# Patient Record
Sex: FEMALE | Race: WHITE | NOT HISPANIC OR LATINO | Employment: STUDENT | URBAN - METROPOLITAN AREA
[De-identification: names, ages, dates, MRNs, and addresses within clinical notes are randomized per-mention and may not be internally consistent; named-entity substitution may affect disease eponyms.]

---
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
## 2021-04-26 ENCOUNTER — AMBULATORY - HEALTHEAST (OUTPATIENT)
Dept: NURSING | Facility: CLINIC | Age: 21
End: 2021-04-26

## 2021-05-03 ENCOUNTER — OFFICE VISIT - HEALTHEAST (OUTPATIENT)
Dept: FAMILY MEDICINE | Facility: CLINIC | Age: 21
End: 2021-05-03

## 2021-05-03 DIAGNOSIS — M54.2 CERVICALGIA: ICD-10-CM

## 2021-05-03 DIAGNOSIS — F40.10 SOCIAL ANXIETY DISORDER: ICD-10-CM

## 2021-05-03 RX ORDER — IBUPROFEN 600 MG/1
600 TABLET, FILM COATED ORAL EVERY 6 HOURS PRN
Status: SHIPPED | COMMUNITY
Start: 2020-10-05 | End: 2021-08-10

## 2021-05-03 ASSESSMENT — ANXIETY QUESTIONNAIRES
GAD7 TOTAL SCORE: 3
5. BEING SO RESTLESS THAT IT IS HARD TO SIT STILL: SEVERAL DAYS
4. TROUBLE RELAXING: NOT AT ALL
2. NOT BEING ABLE TO STOP OR CONTROL WORRYING: SEVERAL DAYS
7. FEELING AFRAID AS IF SOMETHING AWFUL MIGHT HAPPEN: NOT AT ALL
IF YOU CHECKED OFF ANY PROBLEMS ON THIS QUESTIONNAIRE, HOW DIFFICULT HAVE THESE PROBLEMS MADE IT FOR YOU TO DO YOUR WORK, TAKE CARE OF THINGS AT HOME, OR GET ALONG WITH OTHER PEOPLE: SOMEWHAT DIFFICULT
1. FEELING NERVOUS, ANXIOUS, OR ON EDGE: SEVERAL DAYS
3. WORRYING TOO MUCH ABOUT DIFFERENT THINGS: NOT AT ALL
6. BECOMING EASILY ANNOYED OR IRRITABLE: NOT AT ALL

## 2021-05-03 ASSESSMENT — PATIENT HEALTH QUESTIONNAIRE - PHQ9: SUM OF ALL RESPONSES TO PHQ QUESTIONS 1-9: 0

## 2021-05-17 ENCOUNTER — AMBULATORY - HEALTHEAST (OUTPATIENT)
Dept: NURSING | Facility: CLINIC | Age: 21
End: 2021-05-17

## 2021-05-27 ASSESSMENT — PATIENT HEALTH QUESTIONNAIRE - PHQ9
SUM OF ALL RESPONSES TO PHQ QUESTIONS 1-9: 2
SUM OF ALL RESPONSES TO PHQ QUESTIONS 1-9: 0
SUM OF ALL RESPONSES TO PHQ QUESTIONS 1-9: 0

## 2021-05-28 ASSESSMENT — ANXIETY QUESTIONNAIRES
GAD7 TOTAL SCORE: 3
GAD7 TOTAL SCORE: 4
GAD7 TOTAL SCORE: 3

## 2021-05-29 ENCOUNTER — HEALTH MAINTENANCE LETTER (OUTPATIENT)
Age: 21
End: 2021-05-29

## 2021-05-29 NOTE — PROGRESS NOTES
"Patient is a 17 yo female who presents for follow up of her anxiety.    She had started on fluoxetine after her last visit.  She noted that in the first two weeks, she felt jittery, sleep was difficult, and her heart was beating fast.  She notes that those symptoms have since improved. Now, she is noticing less anxiety in anticipation of interactions.  She has not had an anxiety attack since starting on the medication.      As far as school, she has one more project to present tomorrow.  She got into [employer] for college, and is now working on trying to get scholarships.  She hopes to study environmental studies and public policy.  As far as interpersonal stressors: she had one good friend that was somewhat stressful.  She had recently made the decision to no longer be friends with her anymore.  Since then, she has been more social.  She has started applying for jobs for the summer.  Her sleep is fine.  She is anxious about keeping in touch with her current friends.    Patient Active Problem List    Diagnosis Date Noted     Abdominal Pain In The Central Upper Belly (Epigastric)        Current Outpatient Medications:      FLUoxetine (PROZAC) 20 MG capsule, Take 1 capsule (20 mg total) by mouth daily., Disp: 90 capsule, Rfl: 0    Objective     BP 96/62 (Patient Site: Left Arm, Patient Position: Sitting, Cuff Size: Adult Regular)   Pulse 82   Resp 16   Ht 5' 3.75\" (1.619 m)   Wt 116 lb 8 oz (52.8 kg)   LMP 05/13/2019   BMI 20.15 kg/m    General appearance: alert, appears stated age and cooperative   Psych Exam:  Behavior:anxious    Mood:pleaseant, upbeat   Affect:normal   Eye Contact:normal   Thought Content: normal   Insight:normal    Judgement: normal     Little interest or pleasure in doing things: Not at all  Feeling down, depressed, or hopeless: Not at all  Trouble falling or staying asleep, or sleeping too much: Not at all  Feeling tired or having little energy: Several days  Poor appetite or " overeating: Not at all  Feeling bad about yourself - or that you are a failure or have let yourself or your family down: Not at all  Trouble concentrating on things, such as reading the newspaper or watching television: More than half the days  Moving or speaking so slowly that other people could have noticed. Or the opposite - being so fidgety or restless that you have been moving around a lot more than usual: Not at all  Thoughts that you would be better off dead, or of hurting yourself in some way: Not at all  PHQ-9 Total Score: 3  If you checked off any problems, how difficult have these problems made it for you to do your work, take care of things at home, or get along with other people?: Somewhat difficult    JESSE 7 = 7    Marylin was seen today for medication management.    Diagnoses and all orders for this visit:    Social anxiety disorder  -     FLUoxetine (PROZAC) 20 MG capsule; Take 1 capsule (20 mg total) by mouth daily.  Overall patient notes that she feels the medication is helping.  Its not 100% improved, but she had just taken 3 AP tests with a lot happening.  She is looking forward to her summer.  She feels fine to continue on her current dose.  I asked that she check back in in 3 months prior to leaving for college.      Other orders  -     HPV vaccine 9 valent 3 dose IM

## 2021-05-29 NOTE — TELEPHONE ENCOUNTER
RN cannot approve Refill Request    RN can NOT refill this medication med is not covered by policy/route to provider. Last office visit: 5/29/2019 Kathy Metzger MD Last Physical: Visit date not found Last MTM visit: Visit date not found Last visit same specialty: 5/29/2019 Kathy Metzger MD.  Next visit within 3 mo: Visit date not found  Next physical within 3 mo: Visit date not found      Stacy Headley, [facility] Triage/Med Refill 6/11/2019    Requested Prescriptions   Pending Prescriptions Disp Refills     FLUoxetine (PROZAC) 20 MG capsule [Pharmacy Med Name: FLUOXETINE HCL 20 MG CAPSULE] 90 capsule 0     Sig: TAKE 1 CAPSULE BY MOUTH EVERY DAY       SSRI Refill Protocol  Failed - 6/10/2019  1:21 AM        Failed - Age 21 and younger route to prescribing provider     Last office visit with prescriber/PCP: 5/29/2019 Kathy Metzger MD OR same dept: 5/29/2019 Kathy Metzger MD OR same specialty: 5/29/2019 Kathy Metzger MD  Last physical: Visit date not found Last MTM visit: Visit date not found   Next visit within 3 mo: Visit date not found  Next physical within 3 mo: Visit date not found  Prescriber OR PCP: Kathy Metzger MD  Last diagnosis associated with med order: 1. Social anxiety disorder  - FLUoxetine (PROZAC) 20 MG capsule [Pharmacy Med Name: FLUOXETINE HCL 20 MG CAPSULE]; TAKE 1 CAPSULE BY MOUTH EVERY DAY  Dispense: 90 capsule; Refill: 0    If protocol passes may refill for 12 months if within 3 months of last provider visit (or a total of 15 months).             Passed - PCP or prescribing provider visit in last year     Last office visit with prescriber/PCP: 5/29/2019 Kathy Metzger MD OR same dept: 5/29/2019 Kathy Metzger MD OR same specialty: 5/29/2019 Kathy Metzger MD  Last physical: Visit date not found Last MTM visit: Visit date not found   Next visit within 3 mo: Visit date not found  Next physical within 3 mo: Visit date not found  Prescriber OR PCP: Kathy Metzger MD  Last diagnosis associated  with med order: 1. Social anxiety disorder  - FLUoxetine (PROZAC) 20 MG capsule [Pharmacy Med Name: FLUOXETINE HCL 20 MG CAPSULE]; TAKE 1 CAPSULE BY MOUTH EVERY DAY  Dispense: 90 capsule; Refill: 0    If protocol passes may refill for 12 months if within 3 months of last provider visit (or a total of 15 months).

## 2021-05-31 VITALS — WEIGHT: 115.25 LBS | BODY MASS INDEX: 20.42 KG/M2 | HEIGHT: 63 IN

## 2021-05-31 NOTE — PROGRESS NOTES
"Patient is an 19-year-old female presents today for follow-up of her social anxiety disorder.    He is checking and prior to heading out to college next week.  Patient is moving out to [facility] in [place] next Thursday.  She just recently connected with her future roommate.  We did check a marked that she has a history of mental health concerns, and therefore she did get an email from [facility] to get her hooked into counseling.  She notes that her summer is been very good, she has not been stressed.  She notes no negative side effects to the fluoxetine.  She has stayed on fluoxetine 20 mg daily.  She is wondering if she should increase her dose of medication.  Her JESSE-7 had dropped from 7 after being on the medication for a month, to 4 today.  Her PHQ 9 has stayed steady at 3.    She notes in the past, when she felt stressed, the stress would linger for days.  She notes now when she feels stressed, as soon as the problem is resolved, the stress improves entirely.    There are no active problems to display for this patient.        Current Outpatient Medications:      FLUoxetine (PROZAC) 20 MG capsule, Take 1 capsule (20 mg total) by mouth daily., Disp: 90 capsule, Rfl: 3     Objective     BP 96/60   Pulse 80   Ht 5' 3.75\" (1.619 m)   Wt 116 lb 8 oz (52.8 kg)   SpO2 97%   BMI 20.15 kg/m    General appearance: alert, appears stated age and cooperative   Psych Exam:  Behavior:normal    Mood:pleaseant, upbeat   Affect:limited   Eye Contact:normal   Thought Content: normal   Insight:normal    Judgement: normal     JESSE 7 = 4  PHQ9 = 3    Marylin was seen today for follow-up.    Diagnoses and all orders for this visit:    Social anxiety disorder  -     FLUoxetine (PROZAC) 20 MG capsule; Take 1 capsule (20 mg total) by mouth daily.    Discussed at this time, I do not recommend prophylactically increasing her medication.  I do recommend that she get hooked in with student counseling.  I discussed with " her that if she did see an increase in her symptoms, she can certainly MyChart me and we can discuss the possibility of increasing her medication at the time, but I would expect that she follow-up with [facility] while at [facility] [employer] [facility].  Plans on returning here in December for a follow-up visit, and she will also need her last HPV at that time.  Patient to notify me if her symptoms worsen in any way.

## 2021-06-02 VITALS — WEIGHT: 116.5 LBS | HEIGHT: 64 IN | BODY MASS INDEX: 19.89 KG/M2

## 2021-06-02 VITALS — WEIGHT: 119 LBS | BODY MASS INDEX: 20.95 KG/M2

## 2021-06-03 VITALS — WEIGHT: 116.5 LBS | BODY MASS INDEX: 19.89 KG/M2 | HEIGHT: 64 IN

## 2021-06-04 VITALS
OXYGEN SATURATION: 99 % | SYSTOLIC BLOOD PRESSURE: 100 MMHG | WEIGHT: 122.8 LBS | HEIGHT: 64 IN | HEART RATE: 64 BPM | DIASTOLIC BLOOD PRESSURE: 70 MMHG | BODY MASS INDEX: 20.96 KG/M2

## 2021-06-05 VITALS
SYSTOLIC BLOOD PRESSURE: 108 MMHG | BODY MASS INDEX: 22.37 KG/M2 | HEART RATE: 82 BPM | WEIGHT: 129.3 LBS | OXYGEN SATURATION: 98 % | DIASTOLIC BLOOD PRESSURE: 68 MMHG | TEMPERATURE: 98.4 F

## 2021-06-05 NOTE — PROGRESS NOTES
HPI    Patient is an 20 yo female who presents today for follow up of her social anxiety disorder. Patient is going to [employer].  It is going well.  She is getting along with her roommate and her coursework is going well.  She is studying environmental studies.    She feels that the fluoxetine has been useful.  No side effects.  Feeling fine.  She has no concerns about panic attacks.  Things that make her anxious still do, but she feels that she can handle it a lot more.  She had to take a drivers test yesterday for the third time.  She felt anxious, but felt it was a lot more manageable than in the past.    There are no active problems to display for this patient.      Current Outpatient Medications:      FLUoxetine (PROZAC) 20 MG capsule, Take 1 capsule (20 mg total) by mouth daily., Disp: 90 capsule, Rfl: 3       Marylin was seen today for medication check.    Diagnoses and all orders for this visit:    Social anxiety disorder  -     FLUoxetine (PROZAC) 20 MG capsule; Take 1 capsule (20 mg total) by mouth daily.  Patient is doing well on the current medication.  She would like to continue for now.  Discussed the possibility of discontinuing for the summer.  She is interested in this.  She is not sure what her plans are for next summer, but possibly stay in [place].      Other orders  -     HPV vaccine 9 valent 3 dose IM  Patient is due for her last HPV vaccinations.

## 2021-06-08 NOTE — PROGRESS NOTES
"Marylin Hannah is a 19 y.o. female who is being evaluated via a billable telephone visit.      The patient has been notified of following:     \"This telephone visit will be conducted via a call between you and your physician/provider. We have found that certain health care needs can be provided without the need for a physical exam.  This service lets us provide the care you need with a short phone conversation.  If a prescription is necessary we can send it directly to your pharmacy.  If lab work is needed we can place an order for that and you can then stop by our lab to have the test done at a later time.    Telephone visits are billed at different rates depending on your insurance coverage. During this emergency period, for some insurers they may be billed the same as an in-person visit.  Please reach out to your insurance provider with any questions.    If during the course of the call the physician/provider feels a telephone visit is not appropriate, you will not be charged for this service.\"    Patient has given verbal consent to a Telephone visit? Yes    What phone number would you like to be contacted at? [phone]     Patient would like to receive their AVS by AVS Preference: Laurence.    Additional provider notes: Patient is an 19-year-old female who presents for follow-up of her anxiety.    Patient had been taking her fluoxetine 20 mg daily just prior to her freshman year at [employer].  Had a good first year.  Had to come home mid march due to the coronavirus pandemic.  That was a little bit stressful, but distance learning went fine.  Thinking environmental sciences.  This summer, she is currently doing internship to recruit students to vote.  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  No idea fall plan for school.  The school notes that they will release a fall plan on July 15th.  Hoping it is not virtual.     Patient notes that she had stopped her " medication in the beginning of May.  It went pretty well.  Get weird head rushes initially in the first week after stopping it.  She notes that since the start of the protests, she is having anxiety dreams, and she has mild anxiety during the day, but very situational, and not very difficult to deal with.  She is wondering when she should start the medication again.  I discussed that when we had met last summer, she had a lot of difficulty both with school as well as the transitioning into University.  Patient notes that she really enjoyed her University.  I discussed that I will leave it up to her as far as her symptoms of anxiety.  She is familiar with her previous sensations, and if she feels that she is heading in that direction, certainly we can restart the medication.  Patient does not feel that way now.  She will contemplate it after she gets news of what happens in the fall.    There are no active problems to display for this patient.    Current Outpatient Medications   Medication Instructions     FLUoxetine (PROZAC) 20 mg, Oral, DAILY             Assessment/Plan:  1. Social anxiety disorder  Currently she is not taking her medication for the summer and is doing well.  I did discuss with her symptoms worsen certainly she can restart it.  Patient is familiar with her previous sensations regarding her anxiety.  She did meet with a counselor couple times when she was on campus.  She notes that she did not get to connect with a counselor in the spring semester due to early dismissal from the coronavirus pandemic.  I did discuss that there are online plasma forms for therapy.  I did recommend better [email or web address].  She will consider starting her medication after she learns more about what her fall semester might look like.  I asked that she MyChart me if she does restart it, and patient notes that she will.        Phone call duration:  9 minutes    Kathy Metzger MD

## 2021-06-11 ENCOUNTER — OFFICE VISIT - HEALTHEAST (OUTPATIENT)
Dept: PHYSICAL THERAPY | Facility: REHABILITATION | Age: 21
End: 2021-06-11

## 2021-06-11 DIAGNOSIS — M54.2 NECK PAIN: ICD-10-CM

## 2021-06-11 DIAGNOSIS — G44.209 TENSION HEADACHE: ICD-10-CM

## 2021-06-13 NOTE — PROGRESS NOTES
[facility] Well Child Check    ASSESSMENT & PLAN  Marylin Hannah is a 17  y.o. 3  m.o. who has normal growth and normal development.    Diagnoses and all orders for this visit:    Encounter for routine child health examination without abnormal findings  -     Influenza, Seasonal Quad, Preservative Free 36+ Months  -     Hepatitis A vaccine Ped/Adol 2 dose IM (18yr & under)    Mother is thinking about the HPV vaccine.    Return to clinic in 1 year for a Well Child Check or sooner as needed    IMMUNIZATIONS/LABS  Immunizations were reviewed and orders were placed as appropriate. and I have discussed the risks and benefits of all of the vaccine components with the patient/parents.  All questions have been answered.    REFERRALS  Dental:  The patient has already established care with a dentist.  Other:  No additional referrals were made at this time.    ANTICIPATORY GUIDANCE  I have reviewed age appropriate anticipatory guidance.  Social:  Friends and Extracurricular Activities  Parenting:  [place]/Dependence, Homework, Chores, Family Time and Confidential Health Care  Nutrition:  Junk Food, Dieting and Body Image  Play and Communication:  Organized Sports, Creative Talents and Read Books  Health:  Acne, Self-image building, Sleep and Dental Care  Safety:  Swimming Safety and Outdoor Safety Avoiding Sun Exposure  Sexuality:  Body Changes    HEALTH HISTORY  Do you have any concerns that you'd like to discuss today?: Skin issues locate on the right hand    4 small 1 mm flat warts on right hand.  Discussed OTC methods.  If not successful, will return for cryotherapy.      1) Anxiety: going on since high school.  No bullying.  No stressful relationships.  Not a ton of friends, they are quality.  Has had stressful weeks during AP testing.  Never tearful, no panic attacks.    Feels constant.  Does not do caffeine.  Goes to bed at 10 pm.  Falls asleep by 11pm.  Screen time until 10:30.  Tends to stay away on social  media.  No stage fright.  Likes to talk in front of the class.  Discussed the option of counseling and patient would like that.  Mother will check with their benefits.  In the meantime, recommend trying to keep screen time to before 9.  Difficult due to homework.  Recommend starting on Vitamin D 3,000 IU daily.    Do you have any significant health concerns in your family history?: No  No family history on file.  Since your last visit, have there been any major changes in your family, such as a move, job change, separation, divorce, or death in the family?: No    Home  Who lives in your home?:  Parents, 2 younger siblings and a pet dog  Social History     Social History Narrative     No narrative on file     Do you have any trouble with sleep?:  No    Education  What school does your child attend?:  [employer]   What grade is your child in?:  11th  How does the patient perform in school (grades, behavior, attention, homework?: None     Eating  Does patient eat regular meals including fruits and vegetables?:  Yes- prefer to apples, banana, and grapes  What is the patient drinking (cow's milk, water, soda, juice, sports drinks, energy drinks, etc)?: cow's milk- 1%, cow's milk- 2% and water  Does patient have concerns about body or appearance?:  No    Activities  Does the patient have friends?:  yes  Does the patient get at least one hour of physical activity per day?:  yes, Soccer, bament, [place] [employer], [employer]  Does the patient have less than 2 hours of screen time per day (aside from homework)?:  yes, on average 3-5 hours. Mostly on her phone  What does your child do for exercise?:  Stretching at home and active at school   Does the patient have interest/participate in community activities/volunteers/school sports?:  yes    MENTAL HEALTH SCREENING  PHQ-2 Total Score: 0 (10/12/2017  2:38 PM)  PHQ-9 Total Score: 2 (10/12/2017  2:38 PM)    VISION/HEARING  Vision: Completed. See Results  Hearing:  Completed. See  "Results     Hearing Screening    Method: Audiometry    125Hz 250Hz 500Hz 1000Hz 2000Hz 3000Hz 4000Hz 6000Hz 8000Hz   Right ear:   20 25 20  20     Left ear:   20 25 20  20        Visual Acuity Screening    Right eye Left eye Both eyes   Without correction: 20/25 20/20 20/25   With correction:          TB Risk Assessment:  The patient and/or parent/guardian answer positive to:  patient and/or parent/guardian answer 'no' to all screening TB questions    Dental  Is your child being seen by a dentist?  Yes    Patient Active Problem List   Diagnosis     Abdominal Pain In The Central Upper Belly (Epigastric)       Drugs  Does the patient use tobacco/alcohol/drugs?:  no    Safety  Does the patient have any safety concerns (peer or home)?:  no  Does the patient use safety belts, helmets and other safety equipment?:  yes    Sex  Is the patient sexually active?:  no    MEASUREMENTS  Height:  5' 3.2\" (1.605 m)  Weight: 115 lb 4 oz (52.3 kg)  BMI: Body mass index is 20.29 kg/(m^2).  Blood Pressure: 92/60  Blood pressure percentiles are 4 % systolic and 30 % diastolic based on NHBPEP's 4th Report. Blood pressure percentile targets: 90: 124/80, 95: 128/84, 99 + 5 mmH/96.    PHYSICAL EXAM  General Appearance: Healthy-appearing, well nourished, well hydrated  Head: normocephalic, atraumatic  Eyes: Sclerae white, pupils equal and reactive, red reflex normal bilaterally   Ears: Well-positioned, well-formed pinnae; TM pearly gray, translucent, no bulging   Nose: Clear, normal mucosa   Throat: Lips, tongue and mucosa are pink, moist and intact; palate intact   Neck: Supple, symmetrical, no lymphadenopathy  Chest: Lungs clear to auscultation, respirations unlabored   Heart: Regular rate & rhythm, S1 S2, no murmurs, rubs, or gallops   Abdomen: Soft, non-tender, no masses  Pulses: Strong equal femoral pulses, brisk capillary refill   : Not performed.  Extremities: Well-perfused, warm and dry   Neuro: Symmetric tone and strength, " normal gait and coordination.  Spine: No abnormal curvature

## 2021-06-17 ENCOUNTER — OFFICE VISIT - HEALTHEAST (OUTPATIENT)
Dept: PHYSICAL THERAPY | Facility: REHABILITATION | Age: 21
End: 2021-06-17

## 2021-06-17 DIAGNOSIS — M54.2 NECK PAIN: ICD-10-CM

## 2021-06-17 DIAGNOSIS — G44.209 TENSION HEADACHE: ICD-10-CM

## 2021-06-17 NOTE — PROGRESS NOTES
Assessment & Plan     Marylin was seen today for medication management.    Diagnoses and all orders for this visit:    Social anxiety disorder  -     FLUoxetine (PROZAC) 20 MG capsule; Take 1 capsule (20 mg total) by mouth daily.  Patient is doing well on her current medication.  She is not having any untoward side effects.  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  She is plans to return to [employer] this coming fall.    Cervicalgia  -     Ambulatory referral to Adult PT- Internal  Patient has multiple trigger points on her supraspinatus, her trapezius, as well as her left sternocleidomastoid.  I did discuss gentle stretching exercises.  Would recommend evaluation by physical therapy.  Discussed the option of magnesium glycinate 400-800 mg at bedtime to help with night time clenching.      9}     Return in about 6 months (around 11/3/2021) for Annual physical.    Kathy Metzger MD  [facility]   Marylin Hannah is 20 y.o. and presents today for the following health issues   HPI     Anxiety: Patient had tried going off of her fluoxetine last spring\early summer.  She did feel her anxiety rise off with medication.  She did meet with counselors at her school.  She did restart the medication and felt her anxiety improved.  She has not felt any negative side effects of the medication.  She notes that school has been going well.  She finished her sophomore year.  She will be starting her gerardo this fall.  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    1) Muscle spasms:  Having problems with right sided muscle tension and radiation to the base of neck and headache.  Feels like there is liquid in her ears. She was seen by a walk in clinic and put her on muscle relaxants which helped, but only the 7 days.  When she moves her neck, feels something draining in ears.  Got a professional massage did nothing.     There are no active problems to display  for this patient.    Current Outpatient Medications   Medication Instructions     FLUoxetine (PROZAC) 20 mg, Oral, DAILY     ibuprofen (ADVIL,MOTRIN) 600 mg, Oral, Every 6 hours PRN           Objective    /68 (Patient Site: Left Arm, Patient Position: Sitting, Cuff Size: Adult Regular)   Pulse 82   Temp 98.4  F (36.9  C) (Tympanic)   Wt 129 lb 4.8 oz (58.7 kg)   LMP 04/05/2021   SpO2 98%   BMI 22.37 kg/m    Body mass index is 22.37 kg/m .  Physical Exam  General: Patient is alert and oriented no acute distress.  HEENT: TMs are clear bilaterally without effusion.  Neck is supple without lymphadenopathy.  Cervical spine:    Inspection: Normal posture.  Palpation: Multiple trigger points supraspinatus, left SCM with tenderness at left SCM origin.

## 2021-06-21 NOTE — LETTER
Letter by Kathy Metzger MD at      Author: Kathy Metzger MD Service: -- Author Type: --    Filed:  Encounter Date: 5/3/2021 Status: (Other)         May 3, 2021     Patient: Marylin Hannah   YOB: 2000   Date of Visit: 5/3/2021       To Whom it May Concern:    Marylin Hannah was seen in my clinic on 5/3/2021.  Patient has a diagnosis of social anxiety disorder that is made worse by constant contact with strangers, which would be present in dorms and apartments.  Patient has the opportunity to live in a small house off campus.  Due to her diagnosis, I am recommending that she be allowed to live off campus to reduce her anxiety.    If you have any questions or concerns, please don't hesitate to call.    Sincerely,         Electronically signed by Kathy Metzger MD

## 2021-06-24 NOTE — PROGRESS NOTES
HPI:  Patient is a 18-year-old female presents today for discussion of anxiety.    Patient has discussed anxiety for a number of years.  She had pursued more natural options including supplements as well as counseling.  She notes that she had done counseling every couple of weeks for 9 months, but she did not find it all that helpful.  This is her senior in high school.  She is an a student.  She has a lot of stress with regards to college applications, scholarship applications, as well as maintaining her a student status.  She is taking a lot of AP classes.  She notes that her sleep is fine.  She will sleep for up to 8 hours and wake spontaneously with good energy.  She has really good friendships.  However, her main difficulty is that she gets anxious even around her friends.  She has no phobia with presentations.  She even sings solo in her musical acts.  However, after the fact, she will ruminate about her interactions.  She finds that it interferes with her day-to-day.  She notes something as simple as knowing that she has to talk to her teacher about an issue can take up her brain space for the entire hour.  Her therapist had diagnosed her with social anxiety disorder.      She notes in the past year, it has been interfering more with her day-to-day functioning.  She notes that there are moments where the stress builds to the point where she cannot stop worrying.  She is also now at the point where she is having anxiety about having a panic attack.  He notes that these movements occur about once or twice a week.  She notes that they tend to occur at home at the end of the day.  She notes that when it does occur it can last for hours.  She tries a lot of things to calm down.  She notes that exercise helps.  She currently is in [facility] now.  She notes that exercising on the weekends is helpful.  Sometimes she has to watch YouTube to distract her mind.  She has physical symptoms of her acute stress episodes.   She feels nauseous in the pit of her stomach and sometimes she will be sweating.  She notes that she will experience headaches and stomachaches the entire time.  She notes that does occur a couple times a week.  Patient is interested in starting medication.  She had concerns that her mother would not support this option as her mother tends to lean towards natural pathic treatment.    Patient recently got [facility] into [facility].  She is happy with both of those options.    Objective     /60 (Patient Site: Right Arm, Patient Position: Sitting, Cuff Size: Adult Regular)   Pulse 71   Wt 119 lb (54 kg)   LMP 03/06/2019   SpO2 99%   General appearance: alert, appears stated age and cooperative   Psych Exam:  Behavior:normal    Mood:anxious   Affect:normal   Eye Contact:normal   Thought Content: normal   Insight:normal    Judgement: normal     Little interest or pleasure in doing things: Not at all  Feeling down, depressed, or hopeless: Not at all  Trouble falling or staying asleep, or sleeping too much: Not at all  Feeling tired or having little energy: Not at all  Poor appetite or overeating: Not at all  Feeling bad about yourself - or that you are a failure or have let yourself or your family down: Not at all  Trouble concentrating on things, such as reading the newspaper or watching television: Several days  Moving or speaking so slowly that other people could have noticed. Or the opposite - being so fidgety or restless that you have been moving around a lot more than usual: Not at all  Thoughts that you would be better off dead, or of hurting yourself in some way: Not at all  PHQ-9 Total Score: 1  If you checked off any problems, how difficult have these problems made it for you to do your work, take care of things at home, or get along with other people?: Not difficult at all    JESSE 7 = 16      Marylin was seen today for follow-up and  immunizations.    Diagnoses and all orders for this visit:    Social anxiety disorder  -     FLUoxetine (PROZAC) 20 MG capsule; Take 1 capsule (20 mg total) by mouth daily.  With patient's permission, I spoke with her mother by phone to discuss treatment options.  Patient has already worked with counseling and tried natural remedies.  Mother and patient agree to start fluoxetine.  Discussed instructions for use, potential side effects of medications, and expected outcomes.  Discussed close follow up in 4-6 weeks, sooner if having problems.      Other orders  -     Meningococcal MCV4P  -     HPV vaccine 9 valent 3 dose IM; Future  Discussed risks and benefits of each vaccine and patient agreed to proceed.

## 2021-06-26 NOTE — PROGRESS NOTES
[facility]   Cervical Thoracic Initial Evaluation    Patient Name: Marylin Hannah  Date of evaluation: 6/15/2021  Referral Diagnosis: Cervicalgia  Referring provider: Kathy Metzger MD  Visit Diagnosis:     ICD-10-CM    1. Neck pain  M54.2    2. Tension headache  G44.209        Assessment:      Marylin Hannah is a 20 y.o. female who presents to therapy today with chief complaints of chronic neck pain that began in April 2020 that is of idiopathic onset with gradual worsening of time.  The pt reporst she's always had some tenderness in her shoulders and neck from being a high stress individual.  As a result, the pt suffers from headaches 3-4x/wk and neck pain that can become exacerbated with her exercise routine.  PT eval finds the pt with a rounded shoulders, FHP and weakened DNF and tight pecs and SCM and B upper trap trigger points and a NDI of 20%.  The pt's symptoms appear mostly related to a cervical/upper trunk muscular imbalance and is exacerbated by active trigger points.  The pt would benefit from skilled 1:1 PT to address her physical and functional impairments.    Pt. is appropriate for skilled PT intervention as outlined in the Plan of Care (POC).  Pt. is a good candidate for skilled PT services to improve pain levels and function.    Goals:  Pt. will demonstrate/verbalize independence in self-management of condition in : 6 weeks  Pt. will be independent with home exercise program in : 6 weeks  Pt. will report decreased intensity, frequency of : Headache;in 6 weeks (Pt will report max HA symptoms of 2-3/10)    Pt will: return to her exercise program with c/o max neck pain of 2-3/10      Patient's expectations/goals are realistic.    Barriers to Learning or Achieving Goals:  Chronicity of the problem.  Mental illness or emotional factors.  See EMR       Plan / Patient Instructions:        Plan of Care:   Communication with: Referral Source  Patient Related Instruction: Nature of  "Condition;Treatment plan and rationale;Self Care instruction;Basis of treatment;Body mechanics;Posture;Precautions;Next steps;Expected outcome  Times per Week: 1  Number of Weeks: 4-7  Number of Visits: 7  Discharge Planning: When pt meets PT goals or when pt is independent with HEP for ongoing symptom management  Precautions / Restrictions : None  Therapeutic Exercise: ROM;Stretching;Strengthening  Neuromuscular Reeducation: kinesio tape;balance/proprioception;core  Manual Therapy: soft tissue mobilization;myofascial release;joint mobilization;muscle energy      POC and pathology of condition were reviewed with patient.  Pt. is in agreement with the Plan of Care  A Home Exercise Program (HEP) was initiated today.  Pt. was instructed in exercises by PT and patient was given a handout with detailed instructions.  Treatment techniques, plan of care, and goals were discussed with the patient.  The patient agrees to the plan as outlined.  The plan of care is dynamic and will be modified on an ongoing basis.    Plan for next visit: Progress manual therapy and core/cervical strengthening exercises; consider cervical isometrics     Subjective:         Onset Date:  April 2020    Mechanism of Injury:  Gradually getting worse; no specific injury that caused her pain  Per MD note (5/3/2021): \"Patient has multiple trigger points on her supraspinatus, her trapezius, as well as her left sternocleidomastoid.\"    Previous level of function:  See current    Current level of function:  Pt goes to college ([employer] in [place]) and is on summer break.  The pt is currently a  on the [employer] level.     Living situation:  Currently living at home with family but lives in a dorm when at college    Ongoing symptoms:  Neck pain, Headaches    Neck Pain:     -Frequency:  Constant with varying intensity   -Location:  Entire neck, L>R   -Description of pain:  \"Really stiff\"   -Headaches: Yes, 3-4x/wk and they feel like they come " from the neck.     -Exacerbating factors:  Some exercises (sit ups) where pt has to strain her neck; neck pain can just return   -Relieving factors:  Heat, ice, stretch   -Neck Pain history:  Pt feels like she's always had some tenderness in her shoulders from being a high stress individual   -Are you currently seeing a chiropractor?:  No   -Current:  2/10, Best:  2/10, Worst:  6/10      Objective:      Note: Items left blank indicates the item was not performed or not indicated at the time of the evaluation.    Patient Outcome Measures :    Neck Disability Score in %: 20     Scores range from 0-100%, where a score of 0% represents minimal pain and maximal function. The minmal clinically important difference is a score reduction of 10%.    Cervical Thoracic Examination  1. Neck pain     2. Tension headache       Involved side: Bilateral  Posture Observation:      General sitting posture is  fair. Rounded shoulders, FHP    Cervical ROM:    Date: 06/11/21   *Indicate scale AROM   Cervical Flexion 80 deg   Cervical Extension 70 deg    Right Left   Cervical Sidebending 45 deg 50 deg   Cervical Rotation 50 deg 55 deg     Strength: UE MMT WNL    Sensation:   Screened and Intact    Reflex Testing: Screened and Intact    UE Screen: WNL    Palpation:   Tenderness: B upper traps, B levator scap, B SCM (R>L)   Tightness: B upper traps, B SCM, Pecs    Passive Mobility-Joint Integrity: WNL.    Cervical Special Tests     Cervical Special Tests Right Left UE Nerve Mobility Right Left   Cervical compression   Median nerve     Cervical distraction   Ulnar nerve     Spurling s test   Radial nerve     Shoulder abduction sign   Thoracic outlet     Deep neck flexor endurance test 3 sec 3 sec Mariluz     Upper cervical rotation   Adson s     Sharper-Coreen - - Cervical rotation lateral flexion     Alar ligament test - - Other:     Other:   Other:         Treatment Today     TREATMENT MINUTES COMMENTS   Evaluation 20 -Patient educated on  pathology  -Discussed POC   Self-care/ Home management     Manual therapy 10 In order to address pain and ROM restrictions:  -Supine cervical distraction 3 x 1 min  -Supine suboccipital release x 3 min  -STM to cervical paraspinals and bilateral upper traps with noted increased resting tone along the right side.  -Midcervical PIVMs gr IV oscillations as tolerated  -Supine midcervical P/A's gr IV with no c/o pain       Neuromuscular Re-education     Therapeutic Activity     Therapeutic Exercises 23 -Demo/performance of HEP  Exercise #1: Supine chin tucks x 10 reps with cues for DNF isoloation and submax contraction  Comment #1: Supine bridging x 10 reps without UE assist  Exercise #2: Seated levator scap and upper trap stretches, B'ly x 30 sec ea  Comment #2: Standing rows with orange theraband x 10 reps. Cues for tall posture and slight chin tuck     Gait training     Modality__________________                Total 53    Blank areas are intentional and mean the treatment did not include these items.     PT Evaluation Code: (Please list factors)  Patient History/Comorbidities: See above  Examination: See above  Clinical Presentation: Stable  Clinical Decision Making: Low    Patient History/  Comorbidities Examination  (body structures and functions, activity limitations, and/or participation restrictions) Clinical Presentation Clinical Decision Making (Complexity)   No documented Comorbidities or personal factors 1-2 Elements Stable and/or uncomplicated Low   1-2 documented comorbidities or personal factor 3 Elements Evolving clinical presentation with changing characteristics Moderate   3-4 documented comorbidities or personal factors 4 or more Unstable and unpredictable High            Tiff Palma, PT, DPT,MTC, NCS  6/15/2021  9:32 AM

## 2021-06-26 NOTE — PROGRESS NOTES
[facility] Daily Progress Note    Patient Name: Marylin Hannah  Date: 6/17/2021  Visit #: 2/7  PTA visit #:  NA  Referral Diagnosis: Cervicalgia  Referring provider: Kathy Metzger MD  Visit Diagnosis:     ICD-10-CM    1. Neck pain  M54.2    2. Tension headache  G44.209        Assessment from Initial Evaluation:  Marylin Hannah is a 20 y.o. female who presents to therapy today with chief complaints of chronic neck pain that began in April 2020 that is of idiopathic onset with gradual worsening of time.  The pt reporst she's always had some tenderness in her shoulders and neck from being a high stress individual.  As a result, the pt suffers from headaches 3-4x/wk and neck pain that can become exacerbated with her exercise routine.  PT eval finds the pt with a rounded shoulders, FHP and weakened DNF and tight pecs and SCM and B upper trap trigger points and a NDI of 20%.  The pt's symptoms appear mostly related to a cervical/upper trunk muscular imbalance and is exacerbated by active trigger points.  The pt would benefit from skilled 1:1 PT to address her physical and functional impairments.    Precautions / Restrictions : None      Assessment:     Pt comes to PT with improving HA frequency this date and low grade cervical pain.  The pt's HEP was progressed this date without difficulty.  Continue to place emphasis on periscapular strengthening for mid/low trap strengthening to reduce upper trap and SCM activation in order to improve HA management.  Pt remains appropriate for skilled 1:1 PT to address impairments and make gains towards LTGs.  HEP/POC compliance is  good .    Goal Status:  Pt. will demonstrate/verbalize independence in self-management of condition in : 6 weeks  Pt. will be independent with home exercise program in : 6 weeks  Pt. will report decreased intensity, frequency of : Headache;in 6 weeks (Pt will report max HA symptoms of 2-3/10)    Pt will: return to her exercise  program with c/o max neck pain of 2-3/10      Plan / Patient Education:   Add core strengthening: quadruped alt UE/LE, prone scap strengthening     Continue with initial plan of care.  Progress with home program as tolerated.    Subjective:     Pain Ratin-3/10 neck pain, 0/10 HA    Pt reports that she hasn't had a HA for almost a week.  Pt reports daily HEP compliance.      Objective:     Therapeutic Exercise    For aerobic conditioning and to address axial rigidity/to promote active movement in the upper thoracic spine and scapulas to decrease pain:  -NuStep x 5 min, level #5 , avg METs: 3.5 , SPM: 74   -PT assists with set up and cues to keep SPM > 60    Postural strengthening to improve spinal alignment for optimazation of mm performance and decrease pain:  Exercise #1: Supine chin tucks x 10 reps with cues for DNF isoloation and submax contraction.  Good form after cues  Comment #1: Supine bridging x 10 reps without UE assist  Exercise #2: Seated levator scap and upper trap stretches, B'ly x 30 sec ea; Not performed this date; part of HEP  Comment #2: Standing rows with orange theraband x 10 reps. Cues for tall posture and slight chin tuck  Exercise #3: Sidelying reach and roll x 10 reps ea side; pt demos good ROM-added to HEP  Comment #3: SL shoulder ER with scap sets x 10 reps ea.  Cues to set scapula prior to ER.  Good activiation of mid/lower trap after tactile cues-added to HEP    Manual Therapy    In order to address pain and ROM restrictions:  -Supine cervical distraction 3 x 1 min  -Supine suboccipital release x 3 min  -STM to cervical paraspinals and bilateral upper traps with noted increased resting tone along the right side.  -Midcervical PIVMs gr IV oscillations as tolerated  -Supine midcervical P/A's gr IV with no c/o pain    Treatment Today     TREATMENT MINUTES COMMENTS   Evaluation     Self-care/ Home management     Manual therapy 10 See above   Neuromuscular Re-education     Therapeutic  Activity     Therapeutic Exercises 23 See above   Gait training     Modality__________________                Total 33    Blank areas are intentional and mean the treatment did not include these items.       Tiff Palma, PT, DPT  6/17/2021

## 2021-07-29 ENCOUNTER — HOSPITAL ENCOUNTER (OUTPATIENT)
Dept: PHYSICAL THERAPY | Facility: REHABILITATION | Age: 21
End: 2021-07-29
Payer: COMMERCIAL

## 2021-07-29 DIAGNOSIS — M54.2 NECK PAIN: Primary | ICD-10-CM

## 2021-07-29 DIAGNOSIS — G44.209 TENSION HEADACHE: ICD-10-CM

## 2021-07-29 PROCEDURE — 97110 THERAPEUTIC EXERCISES: CPT | Mod: GP | Performed by: PHYSICAL THERAPIST

## 2021-07-30 NOTE — PROGRESS NOTES
"Outpatient Physical Therapy Discharge Note     Patient: Marylin Hannah  : 2000    Beginning/End Dates of Reporting Period:  21 to 21    Referring Provider: Dr. Kathy Metzger    Therapy Diagnosis: Neck Pain, Tension HA     Client Self Report: Pt comes to PT this date with no c/o pain.  The pt remains compliant with her HEP.  On occasion, she can still have HAs but they are much better. \"I know the exercises help.\"    Objective Measurements:  Goals:  Goal Identifier     Goal Description Pt. will demonstrate/verbalize independence in self-management of condition in : 6 weeks   Target Date     Date Met      Progress (detail required for progress note):     Goal Identifier     Goal Description Pt. will be independent with home exercise program in : 6 weeks   Target Date     Date Met      Progress (detail required for progress note):     Goal Identifier     Goal Description Pt. will report decreased intensity, frequency of : Headache;in 6 weeks (Pt will report max HA symptoms of 2-3/10)   Target Date     Date Met      Progress (detail required for progress note):     Goal Identifier     Goal Description Pt will: return to her exercise program with c/o max neck pain of 2-3/10   Target Date     Date Met      Progress (detail required for progress note):       Progress (detail required for progress note):       Plan:  Discharge from therapy.    Discharge:    Reason for Discharge: Patient has met all goals.    Equipment Issued: None    Discharge Plan: Patient to continue home program.       21 1200   Signing Clinician's Name / Credentials   Signing clinician's name / credentials Tiff Palma, PT, DPT, MTC, NCS   Session Number   Session Number 5/7   Adult Goals   PT Eval Goals 1;2;3;4   Goal 1   Goal Description Pt. will demonstrate/verbalize independence in self-management of condition in : 6 weeks   Goal Progress MET   Goal 2   Goal Description Pt. will be independent with home exercise program in : 6 " "weeks   Goal Progress MET   Goal 3   Goal Description Pt. will report decreased intensity, frequency of : Headache;in 6 weeks (Pt will report max HA symptoms of 2-3/10)   Goal Progress MET   Goal 4   Goal Description Pt will: return to her exercise program with c/o max neck pain of 2-3/10   Goal Progress MET   Subjective Report   Subjective Report Pt comes to PT this date with no c/o pain.  The pt remains compliant with her HEP.  On occasion, she can still have HAs but they are much better. \"I know the exercises help.\"   Treatment Interventions   Interventions Therapeutic Procedure/Exercise   Therapeutic Procedure/exercise   Therapeutic Procedures: strength, endurance, ROM, flexibillity minutes ([id number]) 23   Skilled Intervention PT reviewed her HEP with chin tucks and cervical stretching. PT added prone I's and T's with 3# x 10 reps ea--cues to avoid UT  activation.  Quadruped alt UE/LE x 10 reps with cues to maintain chin tuck and keep pelvis level.  Cues to engage abs and glutes for stability.     Plan   Plan for next session See DC Summary   Comments   Comments See DC Summary   Total Session Time   Timed Code Treatment Minutes 23     "

## 2021-08-10 ENCOUNTER — OFFICE VISIT (OUTPATIENT)
Dept: FAMILY MEDICINE | Facility: CLINIC | Age: 21
End: 2021-08-10
Payer: COMMERCIAL

## 2021-08-10 VITALS
BODY MASS INDEX: 21.85 KG/M2 | OXYGEN SATURATION: 98 % | HEART RATE: 80 BPM | TEMPERATURE: 98.4 F | DIASTOLIC BLOOD PRESSURE: 62 MMHG | HEIGHT: 64 IN | WEIGHT: 128 LBS | SYSTOLIC BLOOD PRESSURE: 98 MMHG | RESPIRATION RATE: 16 BRPM

## 2021-08-10 DIAGNOSIS — F41.9 ANXIETY: ICD-10-CM

## 2021-08-10 DIAGNOSIS — Z00.00 ROUTINE GENERAL MEDICAL EXAMINATION AT A HEALTH CARE FACILITY: Primary | ICD-10-CM

## 2021-08-10 PROCEDURE — 99395 PREV VISIT EST AGE 18-39: CPT | Performed by: FAMILY MEDICINE

## 2021-08-10 ASSESSMENT — MIFFLIN-ST. JEOR: SCORE: 1331.63

## 2021-08-10 NOTE — PROGRESS NOTES
SUBJECTIVE:   CC: Marylin Hannah is an 21 year old woman who presents for preventive health visit.       Patient has been advised of split billing requirements and indicates understanding: Yes  Healthy Habits:     Getting at least 3 servings of Calcium per day:  Yes    Bi-annual eye exam:  NO    Dental care twice a year:  Yes    Sleep apnea or symptoms of sleep apnea:  None    Diet:  Regular (no restrictions)    Frequency of exercise:  4-5 days/week    Duration of exercise:  45-60 minutes    Taking medications regularly:  Yes    Medication side effects:  None    PHQ-2 Total Score: 0    Additional concerns today:  No        Today's PHQ-2 Score:   PHQ-2 ( 1999 Pfizer) 8/7/2021   Q1: Little interest or pleasure in doing things 0   Q2: Feeling down, depressed or hopeless 0   PHQ-2 Score 0   Q1: Little interest or pleasure in doing things Not at all   Q2: Feeling down, depressed or hopeless Not at all   PHQ-2 Score 0       Abuse: Current or Past (Physical, Sexual or Emotional) - No  Do you feel safe in your environment? Yes    Have you ever done Advance Care Planning? (For example, a Health Directive, POLST, or a discussion with a medical provider or your loved ones about your wishes): No, advance care planning information given to patient to review.  Patient plans to discuss their wishes with loved ones or provider.      Social History     Tobacco Use     Smoking status: Never Smoker     Smokeless tobacco: Never Used   Substance Use Topics     Alcohol use: No     If you drink alcohol do you typically have >3 drinks per day or >7 drinks per week? Not applicable    Alcohol Use 8/10/2021   Prescreen: >3 drinks/day or >7 drinks/week? -   Prescreen: >3 drinks/day or >7 drinks/week? Not Applicable     Reviewed orders with patient.  Reviewed health maintenance and updated orders accordingly - Yes    Breast Cancer Screening:    History of abnormal Pap smear: NO - under age 21, PAP not appropriate for age     Reviewed and  "updated as needed this visit by clinical staff  Tobacco  Allergies  Meds  Problems  Med Hx  Surg Hx  Fam Hx          Reviewed and updated as needed this visit by Provider  Tobacco  Allergies  Meds  Problems  Med Hx  Surg Hx  Fam Hx         History reviewed. No pertinent past medical history.   History reviewed. No pertinent surgical history.    Review of Systems  CONSTITUTIONAL: NEGATIVE for fever, chills, change in weight  INTEGUMENTARU/SKIN: NEGATIVE for worrisome rashes, moles or lesions  EYES: NEGATIVE for vision changes or irritation  ENT: NEGATIVE for ear, mouth and throat problems  RESP: NEGATIVE for significant cough or SOB  BREAST: NEGATIVE for masses, tenderness or discharge  CV: NEGATIVE for chest pain, palpitations or peripheral edema  GI: NEGATIVE for nausea, abdominal pain, heartburn, or change in bowel habits  : NEGATIVE for unusual urinary or vaginal symptoms. Periods are regular.  MUSCULOSKELETAL: NEGATIVE for significant arthralgias or myalgia  NEURO: NEGATIVE for weakness, dizziness or paresthesias  PSYCHIATRIC: NEGATIVE for changes in mood or affect     OBJECTIVE:   BP 98/62   Pulse 80   Temp 98.4  F (36.9  C) (Oral)   Resp 16   Ht 1.619 m (5' 3.75\")   Wt 58.1 kg (128 lb)   LMP 07/17/2021 (Within Days)   SpO2 98%   BMI 22.14 kg/m    Physical Exam  GENERAL: healthy, alert and no distress  EYES: Eyes grossly normal to inspection, PERRL and conjunctivae and sclerae normal  HENT: ear canals and TM's normal, nose and mouth without ulcers or lesions  NECK: no adenopathy, no asymmetry, masses, or scars and thyroid normal to palpation  RESP: lungs clear to auscultation - no rales, rhonchi or wheezes  BREAST: normal without masses, tenderness or nipple discharge and no palpable axillary masses or adenopathy  CV: regular rate and rhythm, normal S1 S2, no S3 or S4, no murmur, click or rub, no peripheral edema and peripheral pulses strong  ABDOMEN: soft, nontender, no " "hepatosplenomegaly, no masses and bowel sounds normal  MS: no gross musculoskeletal defects noted, no edema  SKIN: no suspicious lesions or rashes  NEURO: Normal strength and tone, mentation intact and speech normal  PSYCH: mentation appears normal, affect normal/bright    Diagnostic Test Results:  Labs reviewed in Epic    ASSESSMENT/PLAN:       ICD-10-CM    1. Routine general medical examination at a health care facility  Z00.00 REVIEW OF HEALTH MAINTENANCE PROTOCOL ORDERS   2. Anxiety  F41.9 Doing well on fluoxetine       Patient has been advised of split billing requirements and indicates understanding: Yes  COUNSELING:  Reviewed preventive health counseling, as reflected in patient instructions       Regular exercise       Healthy diet/nutrition    Estimated body mass index is 22.14 kg/m  as calculated from the following:    Height as of this encounter: 1.619 m (5' 3.75\").    Weight as of this encounter: 58.1 kg (128 lb).      She reports that she has never smoked. She has never used smokeless tobacco.    Return in about 1 year (around 8/10/2022) for Routine preventive.      Alexandre Redding MD  [facility]  "

## 2021-09-18 ENCOUNTER — HEALTH MAINTENANCE LETTER (OUTPATIENT)
Age: 21
End: 2021-09-18

## 2022-05-31 ASSESSMENT — ENCOUNTER SYMPTOMS
FEVER: 0
HEMATOCHEZIA: 0
PARESTHESIAS: 0
CONSTIPATION: 0
NERVOUS/ANXIOUS: 0
WEAKNESS: 0
COUGH: 0
ABDOMINAL PAIN: 0
PALPITATIONS: 0
JOINT SWELLING: 0
SHORTNESS OF BREATH: 0
MYALGIAS: 1
DIZZINESS: 0
HEARTBURN: 0
NAUSEA: 0
FREQUENCY: 0
BREAST MASS: 0
EYE PAIN: 0
DYSURIA: 0
DIARRHEA: 0
ARTHRALGIAS: 0
CHILLS: 0
SORE THROAT: 0
HEMATURIA: 0
HEADACHES: 0

## 2022-06-01 ENCOUNTER — OFFICE VISIT (OUTPATIENT)
Dept: FAMILY MEDICINE | Facility: CLINIC | Age: 22
End: 2022-06-01
Payer: COMMERCIAL

## 2022-06-01 VITALS
HEART RATE: 92 BPM | SYSTOLIC BLOOD PRESSURE: 118 MMHG | OXYGEN SATURATION: 99 % | BODY MASS INDEX: 22.88 KG/M2 | DIASTOLIC BLOOD PRESSURE: 60 MMHG | HEIGHT: 64 IN | WEIGHT: 134 LBS

## 2022-06-01 DIAGNOSIS — F40.10 SOCIAL ANXIETY DISORDER: ICD-10-CM

## 2022-06-01 DIAGNOSIS — Z11.3 SCREEN FOR STD (SEXUALLY TRANSMITTED DISEASE): ICD-10-CM

## 2022-06-01 DIAGNOSIS — Z00.00 ROUTINE GENERAL MEDICAL EXAMINATION AT A HEALTH CARE FACILITY: Primary | ICD-10-CM

## 2022-06-01 PROCEDURE — 90715 TDAP VACCINE 7 YRS/> IM: CPT | Performed by: FAMILY MEDICINE

## 2022-06-01 PROCEDURE — 90471 IMMUNIZATION ADMIN: CPT | Performed by: FAMILY MEDICINE

## 2022-06-01 PROCEDURE — 87591 N.GONORRHOEAE DNA AMP PROB: CPT | Performed by: FAMILY MEDICINE

## 2022-06-01 PROCEDURE — 87491 CHLMYD TRACH DNA AMP PROBE: CPT | Performed by: FAMILY MEDICINE

## 2022-06-01 PROCEDURE — 99395 PREV VISIT EST AGE 18-39: CPT | Mod: 25 | Performed by: FAMILY MEDICINE

## 2022-06-01 ASSESSMENT — ENCOUNTER SYMPTOMS
COUGH: 0
PARESTHESIAS: 0
MYALGIAS: 1
DYSURIA: 0
FEVER: 0
HEMATOCHEZIA: 0
BREAST MASS: 0
SHORTNESS OF BREATH: 0
WEAKNESS: 0
HEMATURIA: 0
CHILLS: 0
ABDOMINAL PAIN: 0
CONSTIPATION: 0
JOINT SWELLING: 0
DIARRHEA: 0
FREQUENCY: 0
HEARTBURN: 0
PALPITATIONS: 0
HEADACHES: 0
DIZZINESS: 0
ARTHRALGIAS: 0
EYE PAIN: 0
NAUSEA: 0
SORE THROAT: 0
NERVOUS/ANXIOUS: 0

## 2022-06-01 NOTE — PROGRESS NOTES
SUBJECTIVE:   CC: Marylin Hannah is an 21 year old woman who presents for preventive health visit.       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    One last year at [facility].      Did PT last summer.  Keeps headaches at bay, but not fully improved.  Gets them once a week if she does her stretches.  Will get a larger monitor, riser, start magnesium.  Taking Vitamin D supplement.      Menses: regular  LMP: 3 weeks ago.  Sexually Active: yes, no current relationship.  Contraception: condoms  Last Pap Smear: never  Abnormal Pap: none      Today's PHQ-2 Score:   PHQ-2 ( 1999 Pfizer) 5/31/2022   Q1: Little interest or pleasure in doing things 0   Q2: Feeling down, depressed or hopeless 0   PHQ-2 Score 0   PHQ-2 Total Score (12-17 Years)- Positive if 3 or more points; Administer PHQ-A if positive -   Q1: Little interest or pleasure in doing things Not at all   Q2: Feeling down, depressed or hopeless Not at all   PHQ-2 Score 0       Abuse: Current or Past (Physical, Sexual or Emotional) - No  Do you feel safe in your environment? Yes      Social History     Tobacco Use     Smoking status: Never Smoker     Smokeless tobacco: Never Used   Substance Use Topics     Alcohol use: No         Alcohol Use 5/31/2022   Prescreen: >3 drinks/day or >7 drinks/week? Not Applicable   Prescreen: >3 drinks/day or >7 drinks/week? -       Reviewed orders with  "patient.  Reviewed health maintenance and updated orders accordingly - Yes  Labs reviewed in EPIC    Breast Cancer Screening:        History of abnormal Pap smear: NO - under age 21, PAP not appropriate for age     Reviewed and updated as needed this visit by clinical staff   Tobacco  Allergies  Meds                Reviewed and updated as needed this visit by Provider                   No past medical history on file.   No past surgical history on file.    Review of Systems   Constitutional: Negative for chills and fever.   HENT: Negative for congestion, ear pain, hearing loss and sore throat.    Eyes: Negative for pain and visual disturbance.   Respiratory: Negative for cough and shortness of breath.    Cardiovascular: Negative for chest pain, palpitations and peripheral edema.   Gastrointestinal: Negative for abdominal pain, constipation, diarrhea, heartburn, hematochezia and nausea.   Breasts:  Negative for tenderness, breast mass and discharge.   Genitourinary: Negative for dysuria, frequency, genital sores, hematuria, pelvic pain, urgency, vaginal bleeding and vaginal discharge.   Musculoskeletal: Positive for myalgias. Negative for arthralgias and joint swelling.   Skin: Negative for rash.   Neurological: Negative for dizziness, weakness, headaches and paresthesias.   Psychiatric/Behavioral: Negative for mood changes. The patient is not nervous/anxious.           OBJECTIVE:   /60   Pulse 92   Ht 1.632 m (5' 4.25\")   Wt 60.8 kg (134 lb)   SpO2 99%   BMI 22.82 kg/m    Physical Exam  GENERAL: healthy, alert and no distress  EYES: Eyes grossly normal to inspection, PERRL and conjunctivae and sclerae normal  HENT: ear canals and TM's normal, nose and mouth without ulcers or lesions  NECK: no adenopathy, no asymmetry, masses, or scars and thyroid normal to palpation  RESP: lungs clear to auscultation - no rales, rhonchi or wheezes  CV: regular rate and rhythm, normal S1 S2, no S3 or S4, no murmur, " "click or rub, no peripheral edema and peripheral pulses strong  ABDOMEN: soft, nontender, no hepatosplenomegaly, no masses and bowel sounds normal  MS: no gross musculoskeletal defects noted, no edema  SKIN: no suspicious lesions or rashes  NEURO: Normal strength and tone, mentation intact and speech normal  PSYCH: mentation appears normal, affect normal/bright    none     ASSESSMENT/PLAN:   Marylin was seen today for physical.    Diagnoses and all orders for this visit:    Routine general medical examination at a health care facility  Patient will try magnesium glycinate as well as optimize her work station for headache improvement.    Screen for STD (sexually transmitted disease)  -     NEISSERIA GONORRHOEA PCR; Future  -     CHLAMYDIA TRACHOMATIS PCR; Future  Discussed routine screening based on age.    Other orders  -     TDAP VACCINE (Adacel, Boostrix)  [[id number]]            COUNSELING:  Reviewed preventive health counseling, as reflected in patient instructions       Regular exercise       Healthy diet/nutrition       Contraception       Safe sex practices/STD prevention    Estimated body mass index is 22.82 kg/m  as calculated from the following:    Height as of this encounter: 1.632 m (5' 4.25\").    Weight as of this encounter: 60.8 kg (134 lb).        She reports that she has never smoked. She has never used smokeless tobacco.      Counseling Resources:  ATP IV Guidelines  Pooled Cohorts Equation Calculator  Breast Cancer Risk Calculator  BRCA-Related Cancer Risk Assessment: FHS-7 Tool  FRAX Risk Assessment  ICSI Preventive Guidelines  Dietary Guidelines for Americans, 2010  USDA's MyPlate  ASA Prophylaxis  Lung CA Screening    Kathy Metzger MD  [facility]  "

## 2022-06-02 LAB
C TRACH DNA SPEC QL NAA+PROBE: NEGATIVE
N GONORRHOEA DNA SPEC QL NAA+PROBE: NEGATIVE

## 2022-07-08 ENCOUNTER — MYC MEDICAL ADVICE (OUTPATIENT)
Dept: FAMILY MEDICINE | Facility: CLINIC | Age: 22
End: 2022-07-08
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
## 2022-07-08 DIAGNOSIS — S61.419D LACERATION OF HAND WITHOUT FOREIGN BODY, UNSPECIFIED LATERALITY, SUBSEQUENT ENCOUNTER: Primary | ICD-10-CM

## 2022-07-11 ENCOUNTER — TRANSFERRED RECORDS (OUTPATIENT)
Dept: HEALTH INFORMATION MANAGEMENT | Facility: CLINIC | Age: 22
End: 2022-07-11

## 2022-07-27 ENCOUNTER — TRANSFERRED RECORDS (OUTPATIENT)
Dept: HEALTH INFORMATION MANAGEMENT | Facility: CLINIC | Age: 22
End: 2022-07-27

## 2022-08-29 ENCOUNTER — TRANSFERRED RECORDS (OUTPATIENT)
Dept: HEALTH INFORMATION MANAGEMENT | Facility: CLINIC | Age: 22
End: 2022-08-29

## 2022-11-20 ENCOUNTER — HEALTH MAINTENANCE LETTER (OUTPATIENT)
Age: 22
End: 2022-11-20

## 2023-07-08 ENCOUNTER — HEALTH MAINTENANCE LETTER (OUTPATIENT)
Age: 23
End: 2023-07-08

## 2023-12-07 ENCOUNTER — TRANSFERRED RECORDS (OUTPATIENT)
Dept: HEALTH INFORMATION MANAGEMENT | Facility: CLINIC | Age: 23
End: 2023-12-07
Payer: COMMERCIAL

## 2023-12-18 ENCOUNTER — TRANSFERRED RECORDS (OUTPATIENT)
Dept: HEALTH INFORMATION MANAGEMENT | Facility: CLINIC | Age: 23
End: 2023-12-18
Payer: COMMERCIAL

## 2024-08-25 ENCOUNTER — HEALTH MAINTENANCE LETTER (OUTPATIENT)
Age: 24
End: 2024-08-25